# Patient Record
Sex: FEMALE | URBAN - METROPOLITAN AREA
[De-identification: names, ages, dates, MRNs, and addresses within clinical notes are randomized per-mention and may not be internally consistent; named-entity substitution may affect disease eponyms.]

---

## 2023-10-06 ENCOUNTER — APPOINTMENT (RX ONLY)
Dept: URBAN - METROPOLITAN AREA CLINIC 43 | Facility: CLINIC | Age: 53
Setting detail: DERMATOLOGY
End: 2023-10-06

## 2023-10-06 DIAGNOSIS — D485 NEOPLASM OF UNCERTAIN BEHAVIOR OF SKIN: ICD-10-CM

## 2023-10-06 DIAGNOSIS — D22 MELANOCYTIC NEVI: ICD-10-CM

## 2023-10-06 DIAGNOSIS — D18.0 HEMANGIOMA: ICD-10-CM

## 2023-10-06 DIAGNOSIS — L91.8 OTHER HYPERTROPHIC DISORDERS OF THE SKIN: ICD-10-CM

## 2023-10-06 DIAGNOSIS — L82.1 OTHER SEBORRHEIC KERATOSIS: ICD-10-CM

## 2023-10-06 PROBLEM — D22.5 MELANOCYTIC NEVI OF TRUNK: Status: ACTIVE | Noted: 2023-10-06

## 2023-10-06 PROBLEM — D48.5 NEOPLASM OF UNCERTAIN BEHAVIOR OF SKIN: Status: ACTIVE | Noted: 2023-10-06

## 2023-10-06 PROBLEM — D18.01 HEMANGIOMA OF SKIN AND SUBCUTANEOUS TISSUE: Status: ACTIVE | Noted: 2023-10-06

## 2023-10-06 PROCEDURE — 99203 OFFICE O/P NEW LOW 30 MIN: CPT | Mod: 25

## 2023-10-06 PROCEDURE — ? COUNSELING

## 2023-10-06 PROCEDURE — ? DEFER

## 2023-10-06 PROCEDURE — ? SUNSCREEN RECOMMENDATIONS

## 2023-10-06 PROCEDURE — 11102 TANGNTL BX SKIN SINGLE LES: CPT

## 2023-10-06 PROCEDURE — 11103 TANGNTL BX SKIN EA SEP/ADDL: CPT

## 2023-10-06 PROCEDURE — ? BIOPSY BY SHAVE METHOD

## 2023-10-06 ASSESSMENT — LOCATION ZONE DERM
LOCATION ZONE: TRUNK
LOCATION ZONE: LEG
LOCATION ZONE: ARM
LOCATION ZONE: AXILLAE

## 2023-10-06 ASSESSMENT — LOCATION SIMPLE DESCRIPTION DERM
LOCATION SIMPLE: LEFT FOREARM
LOCATION SIMPLE: LEFT POPLITEAL SKIN
LOCATION SIMPLE: UPPER BACK
LOCATION SIMPLE: LEFT AXILLARY VAULT

## 2023-10-06 ASSESSMENT — LOCATION DETAILED DESCRIPTION DERM
LOCATION DETAILED: LEFT POPLITEAL SKIN
LOCATION DETAILED: INFERIOR THORACIC SPINE
LOCATION DETAILED: LEFT PROXIMAL DORSAL FOREARM
LOCATION DETAILED: LEFT AXILLARY VAULT
LOCATION DETAILED: SUPERIOR THORACIC SPINE

## 2023-10-06 NOTE — PROCEDURE: BIOPSY BY SHAVE METHOD
Wound Care: Petrolatum
Anesthesia Volume In Cc (Will Not Render If 0): 1
Biopsy Method: Dermablade
X Size Of Lesion In Cm: 0
Curettage Text: The wound bed was treated with curettage after the biopsy was performed.
Detail Level: Detailed
Validate Triangulation: No
Cryotherapy Text: The wound bed was treated with cryotherapy after the biopsy was performed.
Billing Type: Third-Party Bill
Consent: The procedure risks were reviewed, including but not limited to scarring, infection, bleeding, incomplete removal, nerve damage, and an allergy to anesthesia, and consent was obtained from the patient.
Information: Selecting Yes will display possible errors in your note based on the variables you have selected. This validation is only offered as a suggestion for you. PLEASE NOTE THAT THE VALIDATION TEXT WILL BE REMOVED WHEN YOU FINALIZE YOUR NOTE. IF YOU WANT TO FAX A PRELIMINARY NOTE YOU WILL NEED TO TOGGLE THIS TO 'NO' IF YOU DO NOT WANT IT IN YOUR FAXED NOTE.
Dressing: bandage
Depth Of Biopsy: dermis
Electrodesiccation Text: The wound bed was treated with electrodesiccation after the biopsy was performed.
Electrodesiccation And Curettage Text: The wound bed was treated with electrodesiccation and curettage after the biopsy was performed.
Post-Care Instructions: I reviewed the post-care instructions in detail with the patient. The patient is to keep the biopsy site dry overnight, and then apply petrolatum ointment twice daily until the area is healed.
Was A Bandage Applied: Yes
Silver Nitrate Text: The wound bed was treated with silver nitrate after the biopsy was performed.
Biopsy Type: H and E
Anesthesia Type: 1% lidocaine with epinephrine
Notification Instructions: The patient will be notified of the biopsy results.
Hemostasis: Aluminum Chloride
Type Of Destruction Used: Curettage
Lab: [id number]
Billing Type: Third-Party Bill

## 2023-10-06 NOTE — PROCEDURE: DEFER
Detail Level: Detailed
Reason To Defer Override: Patient is considering whether to have the procedure
Size Of Lesion In Cm (Optional): 0
Introduction Text (Please End With A Colon): The following procedure was deferred:
Other Procedure: Skin Tag Removal

## 2023-11-27 ENCOUNTER — APPOINTMENT (RX ONLY)
Dept: URBAN - METROPOLITAN AREA CLINIC 43 | Facility: CLINIC | Age: 53
Setting detail: DERMATOLOGY
End: 2023-11-27

## 2023-11-27 DIAGNOSIS — L91.8 OTHER HYPERTROPHIC DISORDERS OF THE SKIN: ICD-10-CM

## 2023-11-27 PROCEDURE — ? BENIGN DESTRUCTION COSMETIC MULTI

## 2023-11-27 ASSESSMENT — LOCATION SIMPLE DESCRIPTION DERM
LOCATION SIMPLE: LEFT AXILLARY VAULT
LOCATION SIMPLE: RIGHT AXILLARY VAULT
LOCATION SIMPLE: CHEST

## 2023-11-27 ASSESSMENT — LOCATION DETAILED DESCRIPTION DERM
LOCATION DETAILED: RIGHT AXILLARY VAULT
LOCATION DETAILED: MIDDLE STERNUM
LOCATION DETAILED: LEFT AXILLARY VAULT

## 2023-11-27 ASSESSMENT — LOCATION ZONE DERM
LOCATION ZONE: AXILLAE
LOCATION ZONE: TRUNK

## 2023-11-27 NOTE — PROCEDURE: BENIGN DESTRUCTION COSMETIC MULTI
Total Number Of Lesions Treated: 10
Post-Care Instructions: I reviewed the post-care instructions in detail with the patient. The patient is to apply petrolatum ointment to the treated areas and to avoid picking at any of the lesions.
Consent: The procedure risks were reviewed, including but not limited to scarring, pigmentary change, infection, recurrence, and incomplete removal, and consent was obtained from the patient.
Anesthesia Volume In Cc: 1
Detail Level: Zone